# Patient Record
Sex: MALE | Race: ASIAN | NOT HISPANIC OR LATINO | URBAN - METROPOLITAN AREA
[De-identification: names, ages, dates, MRNs, and addresses within clinical notes are randomized per-mention and may not be internally consistent; named-entity substitution may affect disease eponyms.]

---

## 2020-08-21 ENCOUNTER — EMERGENCY (EMERGENCY)
Facility: HOSPITAL | Age: 70
LOS: 1 days | Discharge: HOME | End: 2020-08-21
Attending: EMERGENCY MEDICINE | Admitting: EMERGENCY MEDICINE
Payer: MEDICARE

## 2020-08-21 VITALS
SYSTOLIC BLOOD PRESSURE: 157 MMHG | HEIGHT: 70 IN | DIASTOLIC BLOOD PRESSURE: 93 MMHG | HEART RATE: 64 BPM | TEMPERATURE: 99 F | WEIGHT: 151.9 LBS | RESPIRATION RATE: 16 BRPM | OXYGEN SATURATION: 97 %

## 2020-08-21 PROCEDURE — 12001 RPR S/N/AX/GEN/TRNK 2.5CM/<: CPT

## 2020-08-21 PROCEDURE — 99282 EMERGENCY DEPT VISIT SF MDM: CPT | Mod: 25

## 2020-08-21 PROCEDURE — 99282 EMERGENCY DEPT VISIT SF MDM: CPT

## 2020-08-21 NOTE — ED PROVIDER NOTE - PHYSICAL EXAMINATION
left hand 2nd digit: 1 cm superficial skin laceration noted to palmar aspect distal to DIP joint, no FB noted, no active bleeding, no nailbed involvement.  FROM of digit at MCP, PIP, and DIP joint.  Cap refill is less then 2 seconds.  No evidence of FB noted, no karri TTP, no overlying cellulitis.  Sensation grossly intact to finger, +radial pulse, cap refill less then 2 seconds

## 2020-08-21 NOTE — ED PROVIDER NOTE - OBJECTIVE STATEMENT
Pt is a 68 yo male who presents to the ED with a cc of finger laceration.  Pt with no significant past medical history.  Reports that this morning he was using a knife recently sharpened to cut a lemon when he accidently cute the distal tip of his left hand 2nd digit sustaining a laceration.  Bleeding is controlled with direct pressure.  Denies numbness or tingling to ext, denies additional injury.  Pt is right hand dominant. Believes that Tetanus is UTD and is refusing at this time.

## 2020-08-21 NOTE — ED PROVIDER NOTE - NSFOLLOWUPINSTRUCTIONS_ED_ALL_ED_FT
Return to the ED for any new or worsening symptoms including but not limited to worsening pain at site, swelling, redness, or discharge from region  Keep wound clean and dry until glue dissolves avoid using lotion on site  Take your medications as previously prescribed  Advance activity as tolerated

## 2020-08-21 NOTE — ED PROVIDER NOTE - CLINICAL SUMMARY MEDICAL DECISION MAKING FREE TEXT BOX
Pt is a 70 yo male who presents to the ED with a cc of finger laceration.  Pt with no significant past medical history.  Reports that this morning he was using a knife recently sharpened to cut a lemon when he accidently cute the distal tip of his left hand 2nd digit sustaining a laceration.  Bleeding is controlled with direct pressure.  Denies numbness or tingling to ext, denies additional injury.  Pt is right hand dominant. Believes that Tetanus is UTD and is refusing at this time.  Will clean and repair wound with Dermabond

## 2020-08-21 NOTE — ED ADULT NURSE NOTE - OBJECTIVE STATEMENT
a/ox4 patient with laceration to left second digit. Patient states he was using a knife and accidently slipped, cutting his left second digit. No bleeding at site, clean and dry. Will cont to monitor.

## 2021-09-21 PROBLEM — Z78.9 OTHER SPECIFIED HEALTH STATUS: Chronic | Status: ACTIVE | Noted: 2020-08-21

## 2021-10-04 ENCOUNTER — APPOINTMENT (OUTPATIENT)
Age: 71
End: 2021-10-04
Payer: MEDICARE

## 2021-10-04 VITALS
DIASTOLIC BLOOD PRESSURE: 80 MMHG | OXYGEN SATURATION: 97 % | WEIGHT: 160 LBS | SYSTOLIC BLOOD PRESSURE: 138 MMHG | HEART RATE: 65 BPM | BODY MASS INDEX: 22.9 KG/M2 | TEMPERATURE: 97.3 F | HEIGHT: 70 IN | RESPIRATION RATE: 14 BRPM

## 2021-10-04 DIAGNOSIS — K59.00 CONSTIPATION, UNSPECIFIED: ICD-10-CM

## 2021-10-04 DIAGNOSIS — Z86.79 PERSONAL HISTORY OF OTHER DISEASES OF THE CIRCULATORY SYSTEM: ICD-10-CM

## 2021-10-04 DIAGNOSIS — Z87.898 PERSONAL HISTORY OF OTHER SPECIFIED CONDITIONS: ICD-10-CM

## 2021-10-04 DIAGNOSIS — Z86.59 PERSONAL HISTORY OF OTHER MENTAL AND BEHAVIORAL DISORDERS: ICD-10-CM

## 2021-10-04 PROBLEM — Z00.00 ENCOUNTER FOR PREVENTIVE HEALTH EXAMINATION: Status: ACTIVE | Noted: 2021-10-04

## 2021-10-04 PROCEDURE — 99204 OFFICE O/P NEW MOD 45 MIN: CPT

## 2021-10-04 PROCEDURE — 46600 DIAGNOSTIC ANOSCOPY SPX: CPT

## 2021-10-04 RX ORDER — ZOLPIDEM TARTRATE 10 MG/1
10 TABLET ORAL
Qty: 30 | Refills: 0 | Status: ACTIVE | COMMUNITY
Start: 2021-05-08

## 2021-10-04 RX ORDER — HYDROCORTISONE 25 MG/G
2.5 CREAM TOPICAL
Refills: 0 | Status: ACTIVE | COMMUNITY

## 2021-10-04 RX ORDER — HYDROCORTISONE 25 MG/G
2.5 CREAM TOPICAL
Qty: 30 | Refills: 0 | Status: ACTIVE | COMMUNITY
Start: 2021-05-16

## 2021-10-04 RX ORDER — CEPHALEXIN 500 MG/1
500 CAPSULE ORAL
Qty: 15 | Refills: 0 | Status: ACTIVE | COMMUNITY
Start: 2021-05-01

## 2021-10-04 RX ORDER — OMEPRAZOLE 40 MG/1
40 CAPSULE, DELAYED RELEASE ORAL
Qty: 30 | Refills: 0 | Status: ACTIVE | COMMUNITY
Start: 2021-05-16

## 2021-10-04 RX ORDER — QUETIAPINE FUMARATE 100 MG/1
100 TABLET ORAL
Qty: 30 | Refills: 0 | Status: ACTIVE | COMMUNITY
Start: 2021-09-21

## 2021-10-04 RX ORDER — VENLAFAXINE HYDROCHLORIDE 150 MG/1
150 CAPSULE, EXTENDED RELEASE ORAL
Qty: 30 | Refills: 0 | Status: ACTIVE | COMMUNITY
Start: 2021-09-21

## 2021-10-04 RX ORDER — VENLAFAXINE HYDROCHLORIDE 75 MG/1
75 CAPSULE, EXTENDED RELEASE ORAL
Qty: 30 | Refills: 0 | Status: ACTIVE | COMMUNITY
Start: 2021-05-11

## 2021-10-04 RX ORDER — MUPIROCIN 20 MG/G
2 OINTMENT TOPICAL
Qty: 22 | Refills: 0 | Status: ACTIVE | COMMUNITY
Start: 2021-05-16

## 2021-10-04 RX ORDER — AMLODIPINE BESYLATE 10 MG/1
10 TABLET ORAL
Qty: 90 | Refills: 0 | Status: ACTIVE | COMMUNITY
Start: 2021-06-13

## 2021-10-04 RX ORDER — FAMOTIDINE 40 MG/1
40 TABLET, FILM COATED ORAL
Qty: 60 | Refills: 0 | Status: ACTIVE | COMMUNITY
Start: 2021-09-19

## 2021-10-04 RX ORDER — GABAPENTIN 100 MG/1
100 CAPSULE ORAL
Qty: 90 | Refills: 0 | Status: ACTIVE | COMMUNITY
Start: 2021-07-11

## 2021-10-04 RX ORDER — ATORVASTATIN CALCIUM 40 MG/1
40 TABLET, FILM COATED ORAL
Qty: 30 | Refills: 0 | Status: ACTIVE | COMMUNITY
Start: 2021-05-18

## 2021-10-04 RX ORDER — BENAZEPRIL HYDROCHLORIDE 20 MG/1
20 TABLET, FILM COATED ORAL
Qty: 90 | Refills: 0 | Status: ACTIVE | COMMUNITY
Start: 2021-06-13

## 2021-10-04 NOTE — CONSULT LETTER
[Dear  ___] : Dear  [unfilled], [Consult Letter:] : I had the pleasure of evaluating your patient, [unfilled]. [Please see my note below.] : Please see my note below. [Consult Closing:] : Thank you very much for allowing me to participate in the care of this patient.  If you have any questions, please do not hesitate to contact me. [Sincerely,] : Sincerely, [FreeTextEntry3] : Ximena Oconnell MD\par

## 2021-10-04 NOTE — PHYSICAL EXAM
[Normal rectal exam] : exam was normal [Reduce Spontaneously] : a spontaneously reducible (grade II) [Normal] : was normal [None] : there was no rectal mass  [No Rash or Lesion] : No rash or lesion [Alert] : alert [Oriented to Person] : oriented to person [Oriented to Place] : oriented to place [Oriented to Time] : oriented to time [Calm] : calm [Skin Tags] : there were no residual hemorrhoidal skin tags seen [Gross Blood] : no gross blood [de-identified] : soft, NTND [de-identified] : No apparent distress [de-identified] : Normocephalic atraumatic [de-identified] : Moving all extremities x4

## 2021-10-04 NOTE — ASSESSMENT
[FreeTextEntry1] : Mr. Vaz presents to the office for the office for consultation re: constipation and resultant occasional hemorrhoidal irritation. Last colonoscopy in 2019 and WNL. Reviewing his medication sheet, suspect he may be experiencing constipation as a side effect of quetiapine. Advised to continue with healthy diet, add miralax nightly and increase to twice daily for desired effect. Cont with at least 64 oz water daily. Will order CT A/P to ensure no other intra-abdominal pathology to attribute symptoms. Patient and daughter understand and are agreeable.

## 2021-10-04 NOTE — HISTORY OF PRESENT ILLNESS
[FreeTextEntry1] : Mr. Vaz presents to the office for consultation. He reports a 1 year h/o constipation. Stools are passed every 1-2 days, small in size, hard in consistency and difficult to evacuate. He will sometimes strain and this results in edematous external hemorrhoids that then resolve on their own. No rectal bleeding noted. No hemorrhoidal burning or itching reported. He admits to ample daily water intake and otherwise healthy diet. Last colonoscopy 2019 and WNL.

## 2021-10-15 ENCOUNTER — RESULT REVIEW (OUTPATIENT)
Age: 71
End: 2021-10-15

## 2021-10-16 ENCOUNTER — APPOINTMENT (OUTPATIENT)
Dept: CT IMAGING | Facility: CLINIC | Age: 71
End: 2021-10-16
Payer: MEDICARE

## 2021-10-16 ENCOUNTER — OUTPATIENT (OUTPATIENT)
Dept: OUTPATIENT SERVICES | Facility: HOSPITAL | Age: 71
LOS: 1 days | End: 2021-10-16
Payer: MEDICARE

## 2021-10-16 DIAGNOSIS — K59.00 CONSTIPATION, UNSPECIFIED: ICD-10-CM

## 2021-10-16 PROCEDURE — 74177 CT ABD & PELVIS W/CONTRAST: CPT | Mod: 26

## 2021-10-16 PROCEDURE — 74177 CT ABD & PELVIS W/CONTRAST: CPT

## 2021-10-16 PROCEDURE — 82565 ASSAY OF CREATININE: CPT

## 2021-12-07 NOTE — ED ADULT NURSE NOTE - SKIN TEMPERATURE MOISTURE
warm Rhombic Flap Text: The defect edges were debeveled with a #15 scalpel blade.  Given the location of the defect and the proximity to free margins a rhombic flap was deemed most appropriate.  Using a sterile surgical marker, an appropriate rhombic flap was drawn incorporating the defect.    The area thus outlined was incised deep to adipose tissue with a #15 scalpel blade.  The skin margins were undermined to an appropriate distance in all directions utilizing iris scissors.

## 2023-10-11 ENCOUNTER — APPOINTMENT (OUTPATIENT)
Dept: NEUROSURGERY | Facility: CLINIC | Age: 73
End: 2023-10-11
Payer: MEDICARE

## 2023-10-11 VITALS
HEART RATE: 71 BPM | SYSTOLIC BLOOD PRESSURE: 112 MMHG | WEIGHT: 155 LBS | HEIGHT: 70 IN | DIASTOLIC BLOOD PRESSURE: 68 MMHG | OXYGEN SATURATION: 98 % | BODY MASS INDEX: 22.19 KG/M2

## 2023-10-11 DIAGNOSIS — R26.9 UNSPECIFIED ABNORMALITIES OF GAIT AND MOBILITY: ICD-10-CM

## 2023-10-11 DIAGNOSIS — R25.1 TREMOR, UNSPECIFIED: ICD-10-CM

## 2023-10-11 PROCEDURE — 99205 OFFICE O/P NEW HI 60 MIN: CPT

## 2023-10-24 ENCOUNTER — OUTPATIENT (OUTPATIENT)
Dept: OUTPATIENT SERVICES | Facility: HOSPITAL | Age: 73
LOS: 1 days | End: 2023-10-24
Payer: MEDICARE

## 2023-10-24 ENCOUNTER — APPOINTMENT (OUTPATIENT)
Dept: NUCLEAR MEDICINE | Facility: IMAGING CENTER | Age: 73
End: 2023-10-24
Payer: MEDICARE

## 2023-10-24 DIAGNOSIS — Z00.8 ENCOUNTER FOR OTHER GENERAL EXAMINATION: ICD-10-CM

## 2023-10-24 PROCEDURE — 78803 RP LOCLZJ TUM SPECT 1 AREA: CPT | Mod: 26

## 2023-10-24 PROCEDURE — A9584: CPT

## 2023-10-24 PROCEDURE — 78803 RP LOCLZJ TUM SPECT 1 AREA: CPT
